# Patient Record
Sex: MALE | Race: WHITE | Employment: STUDENT | URBAN - METROPOLITAN AREA
[De-identification: names, ages, dates, MRNs, and addresses within clinical notes are randomized per-mention and may not be internally consistent; named-entity substitution may affect disease eponyms.]

---

## 2017-06-13 ENCOUNTER — HOSPITAL ENCOUNTER (EMERGENCY)
Facility: CLINIC | Age: 27
Discharge: HOME | End: 2017-06-13
Attending: EMERGENCY MEDICINE | Admitting: EMERGENCY MEDICINE
Payer: COMMERCIAL

## 2017-06-13 VITALS
RESPIRATION RATE: 16 BRPM | SYSTOLIC BLOOD PRESSURE: 123 MMHG | TEMPERATURE: 98 F | BODY MASS INDEX: 30.53 KG/M2 | OXYGEN SATURATION: 98 % | DIASTOLIC BLOOD PRESSURE: 85 MMHG | WEIGHT: 190 LBS | HEART RATE: 68 BPM | HEIGHT: 66 IN

## 2017-06-13 DIAGNOSIS — Z77.098 CHEMICAL EXPOSURE OF EYE: ICD-10-CM

## 2017-06-13 PROCEDURE — 99284 EMERGENCY DEPT VISIT MOD MDM: CPT | Performed by: EMERGENCY MEDICINE

## 2017-06-13 PROCEDURE — 99283 EMERGENCY DEPT VISIT LOW MDM: CPT

## 2017-06-13 RX ORDER — TOBRAMYCIN AND DEXAMETHASONE 3; 1 MG/ML; MG/ML
SUSPENSION/ DROPS OPHTHALMIC
Qty: 1 BOTTLE | Refills: 0 | Status: SHIPPED | OUTPATIENT
Start: 2017-06-13

## 2017-06-13 RX ORDER — TETRACAINE HYDROCHLORIDE 5 MG/ML
2 SOLUTION OPHTHALMIC ONCE
Status: DISCONTINUED | OUTPATIENT
Start: 2017-06-13 | End: 2017-06-13 | Stop reason: HOSPADM

## 2017-06-13 RX ORDER — OXYCODONE AND ACETAMINOPHEN 5; 325 MG/1; MG/1
1-2 TABLET ORAL EVERY 4 HOURS PRN
Qty: 8 TABLET | Refills: 0 | Status: SHIPPED | OUTPATIENT
Start: 2017-06-13

## 2017-06-13 ASSESSMENT — VISUAL ACUITY
OD: 20/25
OS: 20/20

## 2017-06-13 NOTE — ED PROVIDER NOTES
"  History     Chief Complaint   Patient presents with     Eye Problem     patient instilled 3% hydrogen peroxide cleaning solution into both eyes at 2300 last evening thinking he was placing saline eye solution.  Irrigated both eyes at 2305 for ten minutes with tap water.     HPI  Jb Britt is a 27 year old male who presents to emergency department complaining of bilateral eye pain after instilling 3% hydrogen peroxide cleaning solution into both eyes at 2300 last night.  Patient that he is placing saline eye solution in his eyes but accidentally placed the hydrogen peroxide in the eyes.  Patient irrigated his eyes out for a short time and went to bed.  When he woke up the morning he was still having pain.  He states his eyes are watering and this causes vision changes but otherwise does not think he is having vision changes.  He denies any other symptoms.He currently rates his pain a 6/10.     I have reviewed the Medications, Allergies, Past Medical and Surgical History, and Social History in the Epic system.    Allergies:   Allergies   Allergen Reactions     Ceclor [Cefaclor] Rash         No current facility-administered medications on file prior to encounter.   No current outpatient prescriptions on file prior to encounter.    There is no problem list on file for this patient.      History reviewed. No pertinent surgical history.    Social History   Substance Use Topics     Smoking status: Not on file     Smokeless tobacco: Not on file     Alcohol use Not on file         There is no immunization history on file for this patient.    BMI: Estimated body mass index is 30.67 kg/(m^2) as calculated from the following:    Height as of this encounter: 1.676 m (5' 6\").    Weight as of this encounter: 86.2 kg (190 lb).      Review of Systems   Constitutional: Negative for chills and fever.   HENT: Negative for congestion.    Eyes: Positive for photophobia, pain, discharge, redness and visual disturbance. " "  Respiratory: Negative for shortness of breath.    Gastrointestinal: Negative for abdominal pain.   Skin: Negative for rash.   Neurological: Negative for weakness and numbness.       Physical Exam   BP: 142/88  Heart Rate: 69  Temp: 98  F (36.7  C)  Resp: 16  Height: 167.6 cm (5' 6\")  Weight: 86.2 kg (190 lb)  SpO2: 97 %  Physical Exam   Constitutional: He appears well-developed and well-nourished. No distress.   HENT:   Head: Normocephalic.   Mouth/Throat: Oropharynx is clear and moist.   Eyes: EOM are normal. Pupils are equal, round, and reactive to light. Lids are everted and swept, no foreign bodies found. Right eye exhibits chemosis and discharge. Right eye exhibits no exudate and no hordeolum. No foreign body present in the right eye. Left eye exhibits chemosis and discharge. Left eye exhibits no exudate and no hordeolum. No foreign body present in the left eye. Right conjunctiva is injected. Left conjunctiva is injected.   Slit lamp exam:       The right eye shows fluorescein uptake. The right eye shows no corneal abrasion, no corneal flare, no corneal ulcer, no foreign body and no anterior chamber bulge.        The left eye shows fluorescein uptake. The left eye shows no corneal abrasion, no corneal flare, no corneal ulcer and no foreign body.   Pulmonary/Chest: Effort normal.   Musculoskeletal: Normal range of motion.   Neurological: He is alert. He exhibits normal muscle tone.   Skin: Skin is warm and dry. No rash noted.   Psychiatric: He has a normal mood and affect.   Nursing note and vitals reviewed.      ED Course     ED Course     Procedures             Critical Care time:  none               Labs Ordered and Resulted from Time of ED Arrival Up to the Time of Departure from the ED - No data to display    Assessments & Plan (with Medical Decision Making)Patient was given tetracaine in both eye with significant improvement of the pain . Patient was irrigated with ns and gillian lenses. Afterwards he was " able to read fine print without difficulty. I discussed with case with the ophthalmologist at [facility] and he recommended starting the patient on tobradex and having him follow up tomorrow if symptoms have not improved or if they worsen at any time. Patient is in agreement with this plan.     I have reviewed the nursing notes.    I have reviewed the findings, diagnosis, plan and need for follow up with the patient.       Discharge Medication List as of 6/13/2017  9:57 AM      START taking these medications    Details   tobramycin-dexamethasone (TOBRADEX) 0.3-0.1 % ophthalmic susp 1 drop to the affected eye/eyes every 2 hours x  4 doses, then every 6 hours for 2 days, Disp-1 Bottle, R-0, Local Print      oxyCODONE-acetaminophen (PERCOCET) 5-325 MG per tablet Take 1-2 tablets by mouth every 4 hours as needed for pain, Disp-8 tablet, R-0, Local Print             Final diagnoses:   Chemical exposure of eye - 3% hydrogen peroxide       6/13/2017   [facility] [place] EMERGENCY DEPARTMENT     Ron Fregoso MD  06/14/17 0545

## 2017-06-13 NOTE — ED AVS SNAPSHOT
[facility] Emergency Department    [address] [address]    Phone:  [phone]    Fax:  [phone]                                       Jb Britt   MRN: [id number]    Department:  [facility] Emergency Department   Date of Visit:  6/13/2017           After Visit Summary Signature Page     I have received my discharge instructions, and my questions have been answered. I have discussed any challenges I see with this plan with the nurse or doctor.    ..........................................................................................................................................  Patient/Patient Representative Signature      ..........................................................................................................................................  Patient Representative Print Name and Relationship to Patient    ..................................................               ................................................  Date                                            Time    ..........................................................................................................................................  Reviewed by Signature/Title    ...................................................              ..............................................  Date                                                            Time

## 2017-06-13 NOTE — DISCHARGE INSTRUCTIONS
return if symptoms worsen or new symptoms develop.  Take antibiotic I'd ointment as directed.  Take pain medication as needed.  If any vision changes increased pain or other symptoms present please return for further evaluation and care.  Follow-up with ophthalmology or emergency department if symptoms are not improved.  Chemical Exposure: Eye    A chemical exposure, or injury, to the eye can occur when an acidic or basic solution comes in contact with the eye. As soon as the chemical exposure occurs, it is very important to immediately irrigate and flush your eye with sterile saline solution. If sterile saline is unavailable to you immediately, then flushing with tap water is an acceptable alternative. The effects of a chemical exposure can range from mild irritation to permanent scarring and vision loss. The amount of injury to your eye depends on what type of chemical it was, how concentrated it was, whether it was an acidic or basic substance, and how long it was in your eye. After flushing the eye, it is important to follow up with a doctor if you experience any vision blurriness or pain.  It is common to have some irritation for the next 24 hours, even in mild cases. If the exposure was more serious, be sure to follow up as directed.  The pressure inside of the eye can increase hours to days after a chemical eye injury (glaucoma). This requires prompt treatment. Watch for the symptoms described below.  Home care    A cold pack (ice in a plastic bag, wrapped in a towel) may be applied over the eye for 20 minutes at a time. This will reduce pain.    Eye drops may be prescribed to reduce irritation, inflammation, and risk of infection. If no drops were prescribed, you may use over-the-counter decongestant eye drops for irritation or redness.    You may use acetaminophen or ibuprofen to control pain, unless another medicine was prescribed. (Note: If you have chronic liver or kidney disease or have ever had a  stomach ulcer or gastrointestinal bleeding, talk with your doctor before using these medicines.)    If an eye patch was applied:    You may place the cold pack over the eye patch.    If you were given a follow-up appointment for patch removal and re-exam, do not miss it. An eye patch should not be left in place for more than 48 hours, unless you are advised to do so by your healthcare provider.    Do not drive a motor vehicle or use machinery with the eye patch in place. It is difficult to  distance with only one eye.  Follow-up care  Follow up with your healthcare provider, or as directed.  When to seek medical advice  Call your health care provider right away if any of these occur.    Increased eyelid swelling    Increasing pain in the eye    Increasing redness or drainage from the eye    Failure of normal vision to return within 24 to 48 hours    Continued feeling that something is in your eye, lasting more than 48 hours    5313-7261 [employer]. [address] [place] [address], [place] [address]. All rights reserved. This information is not intended as a substitute for professional medical care. Always follow your healthcare professional's instructions.

## 2017-06-13 NOTE — ED NOTES
Patient having bilateral eye pain and headache.  Patient instilled 3% hydrogen peroxide cleaning solution into both eyes at 2300 last evening thinking he was placing saline eye solution.  Irrigated both eyes at 2305 for ten minutes with tap water.  Patient called nurse line at 0430 as his eyes were still painful.  Presented to ED with significant other at 0646.  Patient had both eyes irrigated at 0655 by ERT.

## 2017-06-13 NOTE — ED AVS SNAPSHOT
[facility] Emergency Department    [address] [address]    Phone:  [phone]    Fax:  [phone]                                       Jb Britt   MRN: [id number]    Department:  [facility] Emergency Department   Date of Visit:  6/13/2017           Patient Information     Date Of Birth          1990        Your diagnoses for this visit were:     Chemical exposure of eye 3% hydrogen peroxide       You were seen by Ron Fregoso MD.      Follow-up Information     Follow up with Primary DrMichelet MD.    Why:  As needed        Follow up with [facility] Emergency Department.    Specialty:  EMERGENCY MEDICINE    Why:  If symptoms worsen    Contact information:    [address] [place] [address] [phone]    Additional information:    The medical center is located at   [address]. (between [address] and   [address] in [place], [place] miles north   of [place]).        Discharge Instructions        return if symptoms worsen or new symptoms develop.  Take antibiotic I'd ointment as directed.  Take pain medication as needed.  If any vision changes increased pain or other symptoms present please return for further evaluation and care.  Follow-up with ophthalmology or emergency department if symptoms are not improved.  Chemical Exposure: Eye    A chemical exposure, or injury, to the eye can occur when an acidic or basic solution comes in contact with the eye. As soon as the chemical exposure occurs, it is very important to immediately irrigate and flush your eye with sterile saline solution. If sterile saline is unavailable to you immediately, then flushing with tap water is an acceptable alternative. The effects of a chemical exposure can range from mild irritation to permanent scarring and vision loss. The amount of injury to your eye depends on what type of chemical it was, how concentrated it was, whether it was an acidic or basic  substance, and how long it was in your eye. After flushing the eye, it is important to follow up with a doctor if you experience any vision blurriness or pain.  It is common to have some irritation for the next 24 hours, even in mild cases. If the exposure was more serious, be sure to follow up as directed.  The pressure inside of the eye can increase hours to days after a chemical eye injury (glaucoma). This requires prompt treatment. Watch for the symptoms described below.  Home care    A cold pack (ice in a plastic bag, wrapped in a towel) may be applied over the eye for 20 minutes at a time. This will reduce pain.    Eye drops may be prescribed to reduce irritation, inflammation, and risk of infection. If no drops were prescribed, you may use over-the-counter decongestant eye drops for irritation or redness.    You may use acetaminophen or ibuprofen to control pain, unless another medicine was prescribed. (Note: If you have chronic liver or kidney disease or have ever had a stomach ulcer or gastrointestinal bleeding, talk with your doctor before using these medicines.)    If an eye patch was applied:    You may place the cold pack over the eye patch.    If you were given a follow-up appointment for patch removal and re-exam, do not miss it. An eye patch should not be left in place for more than 48 hours, unless you are advised to do so by your healthcare provider.    Do not drive a motor vehicle or use machinery with the eye patch in place. It is difficult to  distance with only one eye.  Follow-up care  Follow up with your healthcare provider, or as directed.  When to seek medical advice  Call your health care provider right away if any of these occur.    Increased eyelid swelling    Increasing pain in the eye    Increasing redness or drainage from the eye    Failure of normal vision to return within 24 to 48 hours    Continued feeling that something is in your eye, lasting more than 48 hours    6608-0199  [employer]. [address] [place] [address]. All rights reserved. This information is not intended as a substitute for professional medical care. Always follow your healthcare professional's instructions.          24 Hour Appointment Hotline       To make an appointment at any [facility], call [phone] ([phone]). If you don't have a family doctor or clinic, we will help you find one. [facility] are conveniently located to serve the needs of you and your family.             Review of your medicines      START taking        Dose / Directions Last dose taken    oxyCODONE-acetaminophen 5-325 MG per tablet   Commonly known as:  PERCOCET   Dose:  1-2 tablet   Quantity:  8 tablet        Take 1-2 tablets by mouth every 4 hours as needed for pain   Refills:  0        tobramycin-dexamethasone 0.3-0.1 % ophthalmic susp   Commonly known as:  TOBRADEX   Quantity:  1 Bottle        1 drop to the affected eye/eyes every 2 hours x  4 doses, then every 6 hours for 2 days   Refills:  0                Prescriptions were sent or printed at these locations (2 Prescriptions)                   Other Prescriptions                Printed at Department/Unit printer (2 of 2)         tobramycin-dexamethasone (TOBRADEX) 0.3-0.1 % ophthalmic susp               oxyCODONE-acetaminophen (PERCOCET) 5-325 MG per tablet                Procedures and tests performed during your visit     Irrigate: eye      Orders Needing Specimen Collection     None      Pending Results     No orders found from 6/11/2017 to 6/14/2017.            Pending Culture Results     No orders found from 6/11/2017 to 6/14/2017.            Pending Results Instructions     If you had any lab results that were not finalized at the time of your Discharge, you can call the ED Lab Result RN at [phone]. You will be contacted by this team for any positive Lab results or changes in treatment. The nurses are available 7 days a week from  "10A to 6:30P.  You can leave a message 24 hours per day and they will return your call.        Test Results From Your Hospital Stay               Thank you for choosing [facility]       Thank you for choosing [facility] for your care. Our goal is always to provide you with excellent care. Hearing back from our patients is one way we can continue to improve our services. Please take a few minutes to complete the written survey that you may receive in the mail after you visit with us. Thank you!        [employer] Information and [employer]-channel Information     W[employer] lets you send messages to your doctor, view your test results, renew your prescriptions, schedule appointments and more. To sign up, go to [email or web address] . Click on \"Log in\" on the left side of the screen, which will take you to the Welcome page. Then click on \"Sign up Now\" on the right side of the page.     You will be asked to enter the access code listed below, as well as some personal information. Please follow the directions to create your username and password.     Your access code is: [email or web address]  Expires: 2017  9:35 AM     Your access code will  in 90 days. If you need help or a new code, please call your [facility] clinic or [phone].        Care EveryWhere ID     This is your Care EveryWhere ID. This could be used by other organizations to access your [facility] medical records  [id number]        After Visit Summary       This is your record. Keep this with you and show to your community pharmacist(s) and doctor(s) at your next visit.                  "

## 2017-06-14 ASSESSMENT — ENCOUNTER SYMPTOMS
EYE PAIN: 1
PHOTOPHOBIA: 1
SHORTNESS OF BREATH: 0
EYE DISCHARGE: 1
FEVER: 0
CHILLS: 0
WEAKNESS: 0
ABDOMINAL PAIN: 0
EYE REDNESS: 1
NUMBNESS: 0

## 2018-01-01 ENCOUNTER — HOSPITAL ENCOUNTER (EMERGENCY)
Facility: CLINIC | Age: 28
Discharge: HOME | End: 2018-01-01
Attending: EMERGENCY MEDICINE | Admitting: EMERGENCY MEDICINE
Payer: COMMERCIAL

## 2018-01-01 VITALS
RESPIRATION RATE: 16 BRPM | WEIGHT: 190 LBS | SYSTOLIC BLOOD PRESSURE: 133 MMHG | HEIGHT: 66 IN | BODY MASS INDEX: 30.53 KG/M2 | TEMPERATURE: 97.4 F | OXYGEN SATURATION: 98 % | HEART RATE: 88 BPM | DIASTOLIC BLOOD PRESSURE: 80 MMHG

## 2018-01-01 DIAGNOSIS — S69.91XA FISH HOOK INJURY OF FINGER OF RIGHT HAND, INITIAL ENCOUNTER: ICD-10-CM

## 2018-01-01 PROCEDURE — 10120 INC&RMVL FB SUBQ TISS SMPL: CPT | Performed by: EMERGENCY MEDICINE

## 2018-01-01 PROCEDURE — 10120 INC&RMVL FB SUBQ TISS SMPL: CPT | Mod: Z6 | Performed by: EMERGENCY MEDICINE

## 2018-01-01 PROCEDURE — 99284 EMERGENCY DEPT VISIT MOD MDM: CPT | Mod: Z6 | Performed by: EMERGENCY MEDICINE

## 2018-01-01 PROCEDURE — 99283 EMERGENCY DEPT VISIT LOW MDM: CPT | Mod: 25 | Performed by: EMERGENCY MEDICINE

## 2018-01-01 RX ORDER — HYDROCODONE BITARTRATE AND ACETAMINOPHEN 5; 325 MG/1; MG/1
1-2 TABLET ORAL EVERY 4 HOURS PRN
Qty: 12 TABLET | Refills: 0 | Status: SHIPPED | OUTPATIENT
Start: 2018-01-01

## 2018-01-01 ASSESSMENT — ENCOUNTER SYMPTOMS
NUMBNESS: 0
WOUND: 1
WEAKNESS: 0
COLOR CHANGE: 0

## 2018-01-01 NOTE — ED AVS SNAPSHOT
[facility] Emergency Department    [address] [address]    Phone:  [phone]    Fax:  [phone]                                       Jb Britt   MRN: [id number]    Department:  [facility] Emergency Department   Date of Visit:  1/1/2018           Patient Information     Date Of Birth          1990        Your diagnoses for this visit were:     Fish hook injury of finger of right hand, initial encounter        You were seen by Jc Kemp MD.      Discharge References/Attachments     FISH HOOK REMOVAL (ENGLISH)      24 Hour Appointment Hotline       To make an appointment at any [facility] clinic, call [phone] ([phone]). If you don't have a family doctor or clinic, we will help you find one. [facility] clinics are conveniently located to serve the needs of you and your family.             Review of your medicines      START taking        Dose / Directions Last dose taken    HYDROcodone-acetaminophen 5-325 MG per tablet   Commonly known as:  NORCO   Dose:  1-2 tablet   Quantity:  12 tablet        Take 1-2 tablets by mouth every 4 hours as needed for moderate to severe pain   Refills:  0          Our records show that you are taking the medicines listed below. If these are incorrect, please call your family doctor or clinic.        Dose / Directions Last dose taken    oxyCODONE-acetaminophen 5-325 MG per tablet   Commonly known as:  PERCOCET   Dose:  1-2 tablet   Quantity:  8 tablet        Take 1-2 tablets by mouth every 4 hours as needed for pain   Refills:  0        tobramycin-dexamethasone 0.3-0.1 % ophthalmic susp   Commonly known as:  TOBRADEX   Quantity:  1 Bottle        1 drop to the affected eye/eyes every 2 hours x  4 doses, then every 6 hours for 2 days   Refills:  0                Prescriptions were sent or printed at these locations (1 Prescription)                   Other Prescriptions                Printed at Department/Unit printer (1 of 1)  "        HYDROcodone-acetaminophen (NORCO) 5-325 MG per tablet                Procedures and tests performed during your visit     Foreign body removal      Orders Needing Specimen Collection     None      Pending Results     No orders found from 2017 to 2018.            Pending Culture Results     No orders found from 2017 to 2018.            Pending Results Instructions     If you had any lab results that were not finalized at the time of your Discharge, you can call the ED Lab Result RN at [phone]. You will be contacted by this team for any positive Lab results or changes in treatment. The nurses are available 7 days a week from 10A to 6:30P.  You can leave a message 24 hours per day and they will return your call.        Test Results From Your Hospital Stay               Thank you for choosing [facility]       Thank you for choosing [facility] for your care. Our goal is always to provide you with excellent care. Hearing back from our patients is one way we can continue to improve our services. Please take a few minutes to complete the written survey that you may receive in the mail after you visit with us. Thank you!        [employer] Information     [employer] lets you send messages to your doctor, view your test results, renew your prescriptions, schedule appointments and more. To sign up, go to [email or web address] . Click on \"Log in\" on the left side of the screen, which will take you to the Welcome page. Then click on \"Sign up Now\" on the right side of the page.     You will be asked to enter the access code listed below, as well as some personal information. Please follow the directions to create your username and password.     Your access code is: [email or web address]  Expires: 2018  7:59 AM     Your access code will  in 90 days. If you need help or a new code, please call your [facility] clinic or [phone].        Care EveryWhere ID     This is your Care EveryWhere ID. This could be " used by other organizations to access your [facility] medical records  [id number]        Equal Access to Services     LOUIE DIETZ : Lavonne Valles, louise mccrary, tracey schneider. So [facility] [phone].    ATENCIÓN: Si habla español, tiene a aguirre disposición servicios gratuitos de asistencia lingüística. Llame al [phone].    We comply with applicable federal civil rights laws and [place] laws. We do not discriminate on the basis of race, color, national origin, age, disability, sex, sexual orientation, or gender identity.            After Visit Summary       This is your record. Keep this with you and show to your community pharmacist(s) and doctor(s) at your next visit.

## 2018-01-01 NOTE — LETTER
January 1, 2018      To Whom It May Concern:      Jb Britt was seen in our Emergency Department today, Monday 01/01/18.  Please allow him to use a finger splint on the right thumb as needed in the next several days (3 days), due to injury per      Sincerely,          TEMI Kemp MD

## 2018-01-01 NOTE — ED PROVIDER NOTES
"  History     Chief Complaint   Patient presents with     Foreign Body in Skin     Pt has fishook in left thumb     HPI  Jb Britt is a 27 year old male who has a fishhook stuck in the volar aspect of the distal right thumb.  Injury occurred shortly prior to arrival.  He has pain at the site of the fistula can this is exacerbated by an attempt to pull it out.  Severe, constant, aching, nonradiating pain.  No associated CMS abnormality.  He is right-handed.  He is not sure if his tetanus immunization status is up-to-date.      Previous Records Reviewed:  Td/Tdap  06/07/2013            Problem List:    There are no active problems to display for this patient.     Past Medical History:    Past medical history reviewed and he denies any sniffing a past medical history or medical problems.    Past Surgical History:    No past surgical history on file.    Family History:    No family history on file.    Social History:  Marital Status:  Single [1]  Social History   Substance Use Topics     Smoking status: No     Smokeless tobacco: Not on file     Alcohol use Not on file        Medications:      HYDROcodone-acetaminophen (NORCO) 5-325 MG per tablet   tobramycin-dexamethasone (TOBRADEX) 0.3-0.1 % ophthalmic susp   oxyCODONE-acetaminophen (PERCOCET) 5-325 MG per tablet       Review of Systems   Skin: Positive for wound ( Right thumb fishhook embedded in the volar distal thumb.). Negative for color change and pallor.   Neurological: Negative for weakness and numbness.       Physical Exam   BP: 133/80  Pulse: 88  Temp: 97.4  F (36.3  C)  Resp: 16  Height: 167.6 cm (5' 6\")  Weight: 86.2 kg (190 lb)  SpO2: 98 %      Physical Exam   Constitutional: He is oriented to person, place, and time. He appears well-developed and well-nourished. No distress.   Eyes: Conjunctivae and EOM are normal. No scleral icterus.   Cardiovascular: Normal rate, regular rhythm and intact distal pulses.    Musculoskeletal: Normal range of motion. "   [place] embedded in the subcutaneous tissue of the volar distal right thumb.  No soft tissue swelling.  CMS function intact.   Neurological: He is alert and oriented to person, place, and time. He has normal strength. No sensory deficit.   Skin: Skin is warm and dry. No rash noted. He is not diaphoretic. No erythema. No pallor.   Psychiatric: He has a normal mood and affect. His behavior is normal.   Vitals reviewed.      ED Course     ED Course     FB removal  Date/Time: 1/1/2018 7:31 AM  Performed by: BRANNON MEDLEY  Authorized by: BRANNON MEDLEY   Consent: Verbal consent obtained.  Risks and benefits: risks, benefits and alternatives were discussed  Consent given by: patient  Patient understanding: patient states understanding of the procedure being performed  Patient consent: the patient's understanding of the procedure matches consent given  Body area: skin  General location: upper extremity  Location details: right thumb  Anesthesia: local infiltration    Anesthesia:  Local Anesthetic: bupivacaine 0.25% without epinephrine  Localization method: visualized  Removal mechanism: [place] was cut with a pin cutter and then the remaining portion of the hook with Brianda push through the skin and pulled through.  Dressing: antibiotic ointment  Tendon involvement: none  Depth: subcutaneous  Complexity: simple  1 objects recovered.  Post-procedure assessment: foreign body removed  Patient tolerance: Patient tolerated the procedure well with no immediate complications  Comments: An aluminum foam frog splint was placed after the fishhook was removed.                 Labs Ordered and Resulted from Time of ED Arrival Up to the Time of Departure from the ED - No data to display    Assessments & Plan (with Medical Decision Making)   Right thumb fishhook embedded in the volar aspect, subcutaneous tissue, of the distal finger.  It was removed without complication.  The wound was dressed and an AlumaFoam frog splint was  placed.  He was prescribed Norco use for pain refractory to NSAID. Patient was provided instructions for supportive care and will return as needed for worsened condition or worsening symptoms, or new problems or concerns.      I have reviewed the nursing notes.    I have reviewed the findings, diagnosis, plan and need for follow up with the patient.    New Prescriptions    HYDROCODONE-ACETAMINOPHEN (NORCO) 5-325 MG PER TABLET    Take 1-2 tablets by mouth every 4 hours as needed for moderate to severe pain       Final diagnoses:   Fish hook injury of finger of right hand, initial encounter       1/1/2018   [facility] EMERGENCY DEPARTMENT     Jc Kemp MD  01/01/18 0800

## 2018-01-01 NOTE — ED AVS SNAPSHOT
[facility] Emergency Department    [address] [address]    Phone:  [phone]    Fax:  [phone]                                       Jb Britt   MRN: [id number]    Department:  [facility] Emergency Department   Date of Visit:  1/1/2018           After Visit Summary Signature Page     I have received my discharge instructions, and my questions have been answered. I have discussed any challenges I see with this plan with the nurse or doctor.    ..........................................................................................................................................  Patient/Patient Representative Signature      ..........................................................................................................................................  Patient Representative Print Name and Relationship to Patient    ..................................................               ................................................  Date                                            Time    ..........................................................................................................................................  Reviewed by Signature/Title    ...................................................              ..............................................  Date                                                            Time
